# Patient Record
(demographics unavailable — no encounter records)

---

## 2025-07-09 NOTE — ASSESSMENT
[FreeTextEntry1] : 1. Obesity and MASH 2. PCOS 3. pre-DM 4. Hirsutism- no voice changes  5. BP on upper normal range   WILL CALL YOU TO DISCUSS THE RESULTS AFTER LANS ARE REVIEWED   Will work up for hyperandrogenism, adrenal hyperplasia and Cushing. Testosterone level in January was elevated at 92, if repeat testosterone level is more than 150 would proceed with evaluation for testosterone's bleeding ovarian or adrenal lesions  Check labs fasting before 9 am. Collect 24h urine for cortisol level. SHE WILL CALL THE CLINIC AND AND ME KNOW WHEN LAB DRAW AND URINE COLLECTION ARE COMPLETED TO PROCEED WITH 2 day 2mg DST.   Will r/o RENAE with sleep study.   After w/u is completed, she wants to start Spironolactone for hirsutism.  We discussed spironolactone would not reverse the hairs that are already terminal but it will slow down hair growth and progression of hirsutism.

## 2025-07-09 NOTE — HISTORY OF PRESENT ILLNESS
[FreeTextEntry1] : 24-year-old women with medical h/o obesity (BMI 36), MASH, pre-DM, PCOS presented to [facility] care.   Labs-  Jan 2025- TSH 0.8, Free T4 of 1.3, Total T ELEVATED at 92, Free T ELEVATED at 8.1   She was recently officially diagnosed with PCOS on Jan 2025.  Currently on KINZA- managed by GYN  Not sexually active.  No prior pregnancy.   Menarche at age 10. Periods have mostly been irregular.  Has hirsutism on face and body- shaves daily.   Currently on Zepbound 7.5 mg once a week, it was started on March 2025. Lost 30 lb thus far. Occasionally develops constipation, otherwise no SE.   Reports mother and paternal grandmother both had irregular and prolonged periods. Paternal grandmother has hirsutism, not mother. Neither had workups.  Mother has hypothyroidism.

## 2025-07-09 NOTE — PHYSICAL EXAM
[Healthy Appearance] : healthy appearance [No Respiratory Distress] : no respiratory distress [Normal Insight/Judgement] : insight and judgment were intact [de-identified] : pink stretch marks on lower abdomen